# Patient Record
Sex: FEMALE | Race: WHITE
[De-identification: names, ages, dates, MRNs, and addresses within clinical notes are randomized per-mention and may not be internally consistent; named-entity substitution may affect disease eponyms.]

---

## 2020-02-03 ENCOUNTER — HOSPITAL ENCOUNTER (OUTPATIENT)
Dept: HOSPITAL 47 - RADMAMWWP | Age: 40
Discharge: HOME | End: 2020-02-03
Attending: INTERNAL MEDICINE
Payer: COMMERCIAL

## 2020-02-03 DIAGNOSIS — R92.8: ICD-10-CM

## 2020-02-03 DIAGNOSIS — Z12.31: Primary | ICD-10-CM

## 2020-02-03 PROCEDURE — 77067 SCR MAMMO BI INCL CAD: CPT

## 2020-02-03 PROCEDURE — 77063 BREAST TOMOSYNTHESIS BI: CPT

## 2020-02-03 NOTE — MM
Reason for exam: screening  (asymptomatic).

Baseline mammogram.



History:

Patient is nulliparous.

Family history of breast cancer in mother at age 50 and breast cancer in maternal 

aunt at age 50.

Took hormonal contraceptives for 10 years.



Physical Findings:

Nurse did not find any significant physical abnormalities on exam.



MG 3D Screening Mammo W/Cad

Bilateral CC and MLO view(s) were taken.

The breast tissue is heterogeneously dense. This may lower the sensitivity of 

mammography.  No suspicious abnormality on the left breast. Right upper outer 

quadrant focal asymmetry on baseline.



These results were verbally communicated with the patient and result sheet given 

to the patient on 2/3/20.





ASSESSMENT: Incomplete: need additional imaging evaluation, BI-RAD 0



RECOMMENDATION:

Ultrasound of the right breast.

(upper outer quadrant)

## 2020-02-03 NOTE — USB
Reason for exam: additional evaluation requested from abnormal screening.



History:

Patient is nulliparous.

Family history of breast cancer in mother at age 50 and breast cancer in maternal 

aunt at age 50.

Took hormonal contraceptives for 10 years.



Physical Findings:

Breast exam preformed at baseline screening.



US Breast Workup Limited RT

Right limited breast ultrasound including focal area of concern, retroareolar and 

axilla demonstrates a 10 x 5 x 6mm cystic cluster at 11 o'clock. Multiple cysts 

visualized.



These results were verbally communicated with the patient and result sheet given 

to the patient on 2/3/20.





ASSESSMENT: Benign, BI-RAD 2



RECOMMENDATION:

Return to routine screening mammogram schedule for both breasts.

## 2020-08-22 ENCOUNTER — HOSPITAL ENCOUNTER (OUTPATIENT)
Dept: HOSPITAL 47 - RADMRIMAIN | Age: 40
Discharge: HOME | End: 2020-08-22
Attending: PHYSICIAN ASSISTANT
Payer: COMMERCIAL

## 2020-08-22 DIAGNOSIS — M25.475: Primary | ICD-10-CM

## 2020-08-22 NOTE — MR
EXAMINATION TYPE: MR foot LT wo con

 

DATE OF EXAM: 8/22/2020

 

COMPARISON: None

 

HISTORY: Pain along medial aspece of left foot

 

Multiplanar multiecho imaging of the left foot was performed without contrast.

 

There is subcutaneous edema around the lower tibia. There is mild soft tissue swelling and subcutaneo
us edema of the forefoot. The metatarsals are intact. The toes appear intact. There is no evidence of
 a soft tissue mass. I see no bony destructive process. There is no pathologic fluid collection. Ther
e is plantar calcaneal spur formation. Achilles tendon is intact. Medial and lateral flexor tendons o
f the foot appear intact. The collateral ligaments appear intact. There is very slight increased flui
d at the ankle joint. Bones of the hindfoot appear intact. The plantar fascia appears intact.

 

IMPRESSION:

Subcutaneous edema over the forefoot and around the lower leg. No fracture seen. Small ankle joint ef
fusion suggestive of nonspecific mild synovitis.

## 2022-10-06 ENCOUNTER — HOSPITAL ENCOUNTER (OUTPATIENT)
Dept: HOSPITAL 47 - RADMAMWWP | Age: 42
Discharge: HOME | End: 2022-10-06
Attending: INTERNAL MEDICINE
Payer: COMMERCIAL

## 2022-10-06 DIAGNOSIS — Z12.31: Primary | ICD-10-CM

## 2022-10-06 PROCEDURE — 77063 BREAST TOMOSYNTHESIS BI: CPT

## 2022-10-06 PROCEDURE — 77067 SCR MAMMO BI INCL CAD: CPT

## 2022-10-07 NOTE — MM
Reason for Exam: Screening  (asymptomatic). 

Last mammogram was performed 2 year(s) and 8 month(s) ago. 





Patient History: 

Menarche at age 11. Patient has no children. Patient used Hormonal Contraceptives for 10 years.

Maternal aunt had breast cancer, age 50. Mother had breast cancer, age 50.

Last menstrual period: 10/06/2022





Risk Values: 

Roxana 5 year model risk: 1.4%.

NCI Lifetime model risk: 20.1%.





Prior Study Comparison: 

2/3/2020 Bilateral Screening Mammogram, [facility]. 





Tissue Density: 

The breast tissue is heterogeneously dense. This may lower the sensitivity of mammography.





Findings: 

Analyzed By CAD. 

There is no suspicious group of microcalcifications or new suspicious mass in either breast. Stable

chronic nodularity in the left breast. No significant change from prior exam. 





Overall Assessment: Benign, BI-RAD 2





Management: 

Screening Mammogram of both breasts in 1 year.

A clinical breast exam by your physician is recommended on an annual basis and results should be

correlated with mammographic findings.



Electronically signed and approved by: Edouard Ayon D.O.

## 2023-10-26 ENCOUNTER — HOSPITAL ENCOUNTER (OUTPATIENT)
Dept: HOSPITAL 47 - RADMAMWWP | Age: 43
Discharge: HOME | End: 2023-10-26
Attending: INTERNAL MEDICINE
Payer: COMMERCIAL

## 2023-10-26 DIAGNOSIS — Z80.3: ICD-10-CM

## 2023-10-26 DIAGNOSIS — Z12.31: Primary | ICD-10-CM

## 2023-10-26 PROCEDURE — 77067 SCR MAMMO BI INCL CAD: CPT

## 2023-10-26 PROCEDURE — 77063 BREAST TOMOSYNTHESIS BI: CPT

## 2023-10-26 NOTE — MM
Reason for Exam: Screening  (asymptomatic). 

Last screening mammogram was performed 12 month(s) ago.





Patient History: 

Menarche at age 11. Patient has no children. Patient used Hormonal Contraceptives for 10 years.

Maternal aunt had breast cancer, age 50. Mother had breast cancer, age 50. 





Risk Values: 

Roxana 5 year model risk: 1.5%.

NCI Lifetime model risk: 19.9%.





Prior Study Comparison: 

2/3/2020 Bilateral Screening Mammogram, [facility]. 10/6/2022 Bilateral MG 3D screening mammo w/cad, [facility]. 





Tissue Density: 

The breast tissue is heterogeneously dense. This may lower the sensitivity of mammography.





Findings: 

Analyzed By CAD. 

There is no suspicious group of microcalcifications or new suspicious mass. 





Overall Assessment: Negative, BI-RAD 1





Management: 

Screening Mammogram of both breasts in 1 year.

[facility] will attempt to contact patient to return for supplemental views and

ultrasound if indicated.



Patient should continue monthly self-breast exams.  A clinical breast exam by your physician is

recommended on an annual basis.

This exam should not preclude additional follow-up of suspicious palpable abnormalities.



Note on Roxana scores and lifetime risk:

1. A Roxana score greater than 3% is considered moderate risk. If this is the case, consider

specialist referral to assess eligibility for a risk reducing agent.

2. If overall lifetime risk for the development of breast cancer is 20% or higher, the patient may

qualify for future screening with alternating mammogram and breast MRI.



Electronically signed and approved by: Gil Martínez DO

## 2025-03-31 ENCOUNTER — HOSPITAL ENCOUNTER (OUTPATIENT)
Dept: HOSPITAL 47 - RADMAMWWP | Age: 45
Discharge: HOME | End: 2025-03-31
Attending: INTERNAL MEDICINE
Payer: COMMERCIAL

## 2025-03-31 DIAGNOSIS — R92.333: ICD-10-CM

## 2025-03-31 DIAGNOSIS — Z12.31: Primary | ICD-10-CM

## 2025-03-31 DIAGNOSIS — Z92.0: ICD-10-CM

## 2025-03-31 DIAGNOSIS — Z80.3: ICD-10-CM

## 2025-03-31 PROCEDURE — 77067 SCR MAMMO BI INCL CAD: CPT

## 2025-03-31 PROCEDURE — 77063 BREAST TOMOSYNTHESIS BI: CPT

## 2025-04-01 NOTE — MM
Reason for Exam: Screening  (asymptomatic). 

Last mammogram was performed 1 year(s) and 5 month(s) ago. 





Patient History: 

Menarche at age 11. Patient has no children. Patient used Hormonal Contraceptives for 10 years.

Maternal aunt had breast cancer, age 50. Mother had breast cancer, age 50.

Last menstrual period: 03/19/2025





Risk Values: 

Roxana 5 year model risk: 1.7%.

NCI Lifetime model risk: 19.7%.





Prior Study Comparison: 

2/3/2020 Bilateral Screening Mammogram, [facility]. 10/6/2022 Bilateral MG 3D screening mammo w/cad, [facility].

10/26/2023 Bilateral MG 3D screening mammo w/cad, [facility]. 





Tissue Density: 

The breasts are heterogeneously dense, which may obscure small masses.





Findings: 

Analyzed By CAD. 

There is no suspicious group of microcalcifications or new suspicious mass in either breast. 





Overall Assessment: Negative, BI-RAD 1





Management: 

Screening Mammogram of both breasts in 1 year.

.



Patient should continue monthly self-breast exams.  A clinical breast exam by your physician is

recommended on an annual basis.

This exam should not preclude additional follow-up of suspicious palpable abnormalities.



Note on Roxana scores and lifetime risk:

1. A Roxana score greater than 3% is considered moderate risk. If this is the case, consider

specialist referral to assess eligibility for a risk reducing agent.

2. If overall lifetime risk for the development of breast cancer is 20% or higher, the patient may

qualify for future screening with alternating mammogram and breast MRI.









[facility], Workstation: [id number], 4/1/2025 7:27 AM.



Electronically signed and approved by: Leopold M. Fregoli, M.D. Radiologis